# Patient Record
Sex: FEMALE
[De-identification: names, ages, dates, MRNs, and addresses within clinical notes are randomized per-mention and may not be internally consistent; named-entity substitution may affect disease eponyms.]

---

## 2017-12-15 ENCOUNTER — HOSPITAL ENCOUNTER (EMERGENCY)
Dept: HOSPITAL 14 - H.ER | Age: 67
Discharge: HOME | End: 2017-12-15
Payer: COMMERCIAL

## 2017-12-15 VITALS
SYSTOLIC BLOOD PRESSURE: 143 MMHG | DIASTOLIC BLOOD PRESSURE: 78 MMHG | OXYGEN SATURATION: 96 % | RESPIRATION RATE: 17 BRPM | HEART RATE: 76 BPM | TEMPERATURE: 98 F

## 2017-12-15 VITALS — BODY MASS INDEX: 33.5 KG/M2

## 2017-12-15 DIAGNOSIS — E11.9: ICD-10-CM

## 2017-12-15 DIAGNOSIS — I10: ICD-10-CM

## 2017-12-15 DIAGNOSIS — Z79.84: ICD-10-CM

## 2017-12-15 DIAGNOSIS — D25.9: Primary | ICD-10-CM

## 2017-12-15 DIAGNOSIS — Z85.3: ICD-10-CM

## 2017-12-15 DIAGNOSIS — F32.9: ICD-10-CM

## 2017-12-15 NOTE — ED PDOC
HPI: Female  Pain


Time Seen by Provider: 12/15/17 10:28


Chief Complaint (Nursing): Female Genitourinary


Chief Complaint (Provider): Dysuria, Urinary Frequency, Lower Abdominal pain


History Per: Patient


History/Exam Limitations: no limitations


Onset/Duration Of Symptoms: Days (x2)


Current Symptoms Are (Timing): Still Present


Additional Complaint(s): 





Chioma Coburn is a 67 year old female with a past medical history of Breast 

CA presenting to the ED for an evaluation of dysuria, urinary frequency and 

lower abdominal pain occurring for 2 days prior to arrival. The patient 

complains of associated back pain. She denies fever.





PMD: Yoan Grijalva MD








Past Medical History


Reviewed: Historical Data, Nursing Documentation, Vital Signs


Vital Signs: 





 Last Vital Signs











Temp  98 F   12/15/17 10:23


 


Pulse  76   12/15/17 10:23


 


Resp  17   12/15/17 10:23


 


BP  143/78   12/15/17 10:23


 


Pulse Ox  96   12/15/17 10:23














- Medical History


PMH: Depression, Diabetes, HTN, Malignancy (breast CA 5 years ago), Migraine





- Surgical History


Surgical History: 





- Family History


Family History: States: No Known Family Hx





- Immunization History


Hx Tetanus Toxoid Vaccination: No


Hx Influenza Vaccination: No


Hx Pneumococcal Vaccination: No





- Home Medications


Home Medications: 


 Ambulatory Orders











 Medication  Instructions  Recorded


 


Alprazolam [Xanax] 0.5 mg PO DAILY PRN 16


 


Anastrozole [Arimidex 1 mg Tab] 1 mg PO DAILY 16


 


Fluoxetine HCl [Prozac] 40 mg PO DAILY 16


 


Irbesartan [Avapro] 150 mg PO BID 16


 


Ziprasidone [Geodon] 40 mg PO DAILY 16


 


Zolpidem [Ambien] 10 mg PO HS 16


 


metFORMIN [glucOPHAGE] 500 mg PO DAILY 16


 


rOPINIRole [Requip] 1 mg PO HS 16


 


traMADol [Ultram] 50 mg PO Q8 #10 tab 12/15/17














- Allergies


Allergies/Adverse Reactions: 


 Allergies











Allergy/AdvReac Type Severity Reaction Status Date / Time


 


Iodinated Contrast- Oral and Allergy Severe ANAPHYLAXIS Verified 16 19:29





IV Dye     





[Iodinated Contrast Media -     





IV Dye]     


 


iodine Allergy Severe ANAPHYLAXIS Verified 16 19:29


 


Iodine and Iodide Containing Allergy Severe ANAPHYLAXIS Verified 16 19:29





Produc     


 


aspirin Allergy  gi distress Verified 16 19:29


 


simvastatin Allergy  RASH Verified 16 19:29














Review of Systems


ROS Statement: Except As Marked, All Systems Reviewed And Found Negative


Constitutional: Negative for: Fever


Gastrointestinal: Positive for: Abdominal Pain (lower abdominal pain)


Genitourinary Female: Positive for: Dysuria, Frequency


Musculoskeletal: Positive for: Back Pain





Physical Exam





- Reviewed


Nursing Documentation Reviewed: Yes


Vital Signs Reviewed: Yes





- Physical Exam


Appears: Positive for: Non-toxic, No Acute Distress


Head Exam: Positive for: ATRAUMATIC, NORMOCEPHALIC


Skin: Positive for: Normal Color, Warm, Dry


Eye Exam: Positive for: Normal appearance, EOMI


ENT: Positive for: Normal ENT Inspection


Neck: Positive for: Normal, Painless ROM


Cardiovascular/Chest: Positive for: Regular Rate, Rhythm, Chest Non Tender.  

Negative for: Murmur


Respiratory: Positive for: Normal Breath Sounds.  Negative for: Respiratory 

Distress


Gastrointestinal/Abdominal: Positive for: Soft, Tenderness (mild suprapubic 

tenderess)


Back: Positive for: Normal Inspection.  Negative for: L CVA Tenderness, R CVA 

Tenderness


Extremity: Positive for: Normal ROM.  Negative for: Deformity


Neurologic/Psych: Positive for: Alert, Oriented (x3).  Negative for: Motor/

Sensory Deficits





- ECG


O2 Sat by Pulse Oximetry: 96 (RA)


Pulse Ox Interpretation: Normal





Medical Decision Making


Medical Decision Making: 





Time: 10:28


Impression: Dysuria, urinary frequency, lower abdominal pain


Plan:


* [US] Transvaginal


* Reevaluation





--------------------------------------------------------------------------------

----------------- 


Scribe Attestation:


Documented by Michelle Issa, acting as a scribe for Damian Franco MD.


 


Provider Scribe Attestation:


All medical record entries made by the Scribe were at my direction and 

personally dictated by me. I have reviewed the chart and agree that the record 

accurately reflects my personal performance of the history, physical exam, 

medical decision making, and the department course for this patient. I have 

also personally directed, reviewed, and agree with the discharge instructions 

and disposition.








Disposition





- Clinical Impression


Clinical Impression: 


 Fibroid








- Patient ED Disposition


Is Patient to be Admitted: No


Counseled Patient/Family Regarding: Studies Performed, Diagnosis, Need For 

Followup, Rx Given





- Disposition


[facility]errals[facility] 


[facility]


Disposition: Routine/Home


Disposition Time: 13:32


Condition: FAIR


Prescriptions: 


traMADol [Ultram] 50 mg PO Q8 #10 tab


Instructions:  Uterine Fibroids (ED)


Forms:  [facility] (English)

## 2017-12-15 NOTE — US
HISTORY:

Pelvic pain r>l



COMPARISON:

Abdomen pelvis CT without contrast 08/08/2016.



TECHNIQUE:

Transvaginal pelvic ultrasound was performed vascular pelvic pain, 

right side more so than the left. Delete



FINDINGS:



UTERUS:

Measures 5.8 x 4.5 x 3.5 cm. Uterus is atrophic and retroverted with 

multiple myometrial lesions identified. Identified within the upper 

posterior fundus is a hypoechoic partially well defined structure 

measuring 2.2 x 1.6 x 1.1 cm with similar blood flow compared to the 

surrounding parenchyma. Is not shadow or enhance posteriorly. A 2nd 

similar focus is seen at the high anterior fundus sub serosal in 

location measuring 1.6 x 2.1 x 1.0 cm. Immediately inferior to this 

structure is a hyperechoic structure intramural in location measuring 

 1.6 x 1.4 x 1.7 cm. Finally, a 4th structure is questioned inferior 

to this hyperechoic structure at the anterior fundus though it is 

poorly characterized in the periphery. All may represent benign 

myomata including 1 containing calcifications accounting for its 

hyperechoic appearance. However this is not definite. Correlation of 

prior CT is indeterminate as no lesions are seen in the uterus at 

that time. Follow-up MRI may be useful for better characterization. 



ENDOMETRIUM:

Measures 6.0 mm in diameter. Slightly thickened over the normal 

limited 



CERVIX:

5 mm for postmenopausal patient.



RIGHT OVARY:

Not identified. No definite right adnexal mass appreciated either.



LEFT OVARY:

Not identified. No definite left adnexal mass appreciated either.



FREE FLUID:

No significant free fluid noted.



OTHER FINDINGS:

None. 



IMPRESSION:

There are at least 2-3 likely benign myomata seen in the uterine 

fundus anteriorly sulcus posteriorly with 1 potentially partially 

calcified. A 4th lesion is seen which is poorly characterized and the 

endometrium is mildly thickened. Ovaries not identified but there are 

no adnexal mass identified bilaterally either. Further 

characterization of the anterior fundal lesions may be provided by 

MRI with without contrast if clinically warranted.

## 2018-02-14 ENCOUNTER — HOSPITAL ENCOUNTER (EMERGENCY)
Dept: HOSPITAL 14 - H.ER | Age: 68
Discharge: HOME | End: 2018-02-14
Payer: MEDICARE

## 2018-02-14 VITALS — RESPIRATION RATE: 18 BRPM

## 2018-02-14 VITALS
SYSTOLIC BLOOD PRESSURE: 120 MMHG | DIASTOLIC BLOOD PRESSURE: 78 MMHG | TEMPERATURE: 97 F | HEART RATE: 78 BPM | OXYGEN SATURATION: 98 %

## 2018-02-14 VITALS — BODY MASS INDEX: 33.5 KG/M2

## 2018-02-14 DIAGNOSIS — Z79.84: ICD-10-CM

## 2018-02-14 DIAGNOSIS — F32.9: ICD-10-CM

## 2018-02-14 DIAGNOSIS — J06.9: Primary | ICD-10-CM

## 2018-02-14 DIAGNOSIS — E11.9: ICD-10-CM

## 2018-02-14 DIAGNOSIS — Z85.3: ICD-10-CM

## 2018-02-14 DIAGNOSIS — I10: ICD-10-CM

## 2018-02-14 LAB
BASOPHILS # BLD AUTO: 0.1 K/UL (ref 0–0.2)
BASOPHILS NFR BLD: 0.9 % (ref 0–2)
BUN SERPL-MCNC: 12 MG/DL (ref 7–17)
CALCIUM SERPL-MCNC: 10 MG/DL (ref 8.4–10.2)
EOSINOPHIL # BLD AUTO: 0.1 K/UL (ref 0–0.7)
EOSINOPHIL NFR BLD: 1.5 % (ref 0–4)
ERYTHROCYTE [DISTWIDTH] IN BLOOD BY AUTOMATED COUNT: 13.7 % (ref 11.5–14.5)
GFR NON-AFRICAN AMERICAN: > 60
HGB BLD-MCNC: 13.9 G/DL (ref 12–16)
LYMPHOCYTES # BLD AUTO: 1.3 K/UL (ref 1–4.3)
LYMPHOCYTES NFR BLD AUTO: 18 % (ref 20–40)
MCH RBC QN AUTO: 26 PG (ref 27–31)
MCHC RBC AUTO-ENTMCNC: 31.9 G/DL (ref 33–37)
MCV RBC AUTO: 81.5 FL (ref 81–99)
MONOCYTES # BLD: 0.7 K/UL (ref 0–0.8)
MONOCYTES NFR BLD: 10.1 % (ref 0–10)
NEUTROPHILS # BLD: 5 K/UL (ref 1.8–7)
NEUTROPHILS NFR BLD AUTO: 69.5 % (ref 50–75)
NRBC BLD AUTO-RTO: 0.1 % (ref 0–0)
PLATELET # BLD: 284 K/UL (ref 130–400)
PMV BLD AUTO: 8 FL (ref 7.2–11.7)
RBC # BLD AUTO: 5.35 MIL/UL (ref 3.8–5.2)
WBC # BLD AUTO: 7.2 K/UL (ref 4.8–10.8)

## 2018-02-14 NOTE — RAD
HISTORY:



COMPARISON:

11/29/2016.



TECHNIQUE:

Chest PA and lateral



FINDINGS:



LINES AND TUBES:

None. 



LUNG AND PLEURA:

The lungs are well inflated and clear. There is a stable calcified 

granuloma in the left upper lobe. 



HEART AND MEDIASTINUM:

The heart is not enlarged. The hilar and mediastinal contours are 

within normal limits.



SKELETAL STRUCTURES:

The bony structures are within normal limits for the patient's age.



VISUALIZED UPPER ABDOMEN:

Normal.



OTHER FINDINGS:

None.



IMPRESSION:

No active pulmonary disease.

## 2018-02-14 NOTE — ED PDOC
HPI: General Adult


Time Seen by Provider: 18 10:59


Chief Complaint (Nursing): Flu-like Symptoms


History Per: Patient (patient states that she has been sick with cold symptoms 

for the past 2 weeks. She states having some cough, nausea, malaise during this 

time. She called her PMD who advised her to go to the ER.)


History/Exam Limitations: no limitations


Onset/Duration Of Symptoms: Days, Waxing/Waning


Current Symptoms Are (Timing): Still Present





Past Medical History


Reviewed: Historical Data, Nursing Documentation, Vital Signs


Vital Signs: 


 Last Vital Signs











Temp  99.1 F   18 10:56


 


Pulse  84   18 10:56


 


Resp  18   18 10:56


 


BP  118/71   18 10:56


 


Pulse Ox  86 L  18 11:56














- Medical History


PMH: Depression, Diabetes, HTN, Malignancy (breast CA 5 years ago), Migraine





- Surgical History


Surgical History: 





- Family History


Family History: States: No Known Family Hx





- Immunization History


Hx Tetanus Toxoid Vaccination: No


Hx Influenza Vaccination: No


Hx Pneumococcal Vaccination: No





- Home Medications


Home Medications: 


 Ambulatory Orders











 Medication  Instructions  Recorded


 


Alprazolam [Xanax] 0.5 mg PO DAILY PRN 16


 


Anastrozole [Arimidex 1 mg Tab] 1 mg PO DAILY 16


 


Fluoxetine HCl [Prozac] 40 mg PO DAILY 16


 


Irbesartan [Avapro] 150 mg PO BID 16


 


Ziprasidone [Geodon] 40 mg PO DAILY 16


 


Zolpidem [Ambien] 10 mg PO HS 16


 


metFORMIN [glucOPHAGE] 500 mg PO DAILY 16


 


rOPINIRole [Requip] 1 mg PO HS 16


 


Ciprofloxacin HCl [Cipro] 500 mg PO BID #20 tab 12/15/17


 


traMADol [Ultram] 50 mg PO Q8 #10 tab 12/15/17


 


Fluticasone Propionate [Flonase] 4 spr IN DAILY #1 bottle 18


 


Guaifenesin/Pseudoephedrne HCl 1 ter PO Q12H PRN #10 ter 18





[Mucinex D 600 mg-60 mg]  














- Allergies


Allergies/Adverse Reactions: 


 Allergies











Allergy/AdvReac Type Severity Reaction Status Date / Time


 


Iodinated Contrast- Oral and Allergy Severe ANAPHYLAXIS Verified 16 19:29





IV Dye     





[Iodinated Contrast Media -     





IV Dye]     


 


iodine Allergy Severe ANAPHYLAXIS Verified 16 19:29


 


Iodine and Iodide Containing Allergy Severe ANAPHYLAXIS Verified 16 19:29





Produc     


 


aspirin Allergy  gi distress Verified 16 19:29


 


simvastatin Allergy  RASH Verified 16 19:29














Review of Systems


ROS Statement: Except As Marked, All Systems Reviewed And Found Negative


Constitutional: Negative for: Fever, Chills


Respiratory: Positive for: Cough, Shortness of Breath


Gastrointestinal: Negative for: Nausea, Vomiting, Abdominal Pain


Genitourinary Female: Positive for: Dysuria





- Laboratory Results


Result Diagrams: 


 18 12:06





 18 12:06





- ECG


O2 Sat by Pulse Oximetry: 86





Medical Decision Making


Medical Decision Making: 





patient not in distress or discomfort. She is concerned about the fullness in 

her head. Agrees to a trial of mucinex-d and flonase and followup with Dr. VIRIDIANA Grijalva





Disposition





- Clinical Impression


Clinical Impression: 


 URI (upper respiratory infection)








- Patient ED Disposition


Is Patient to be Admitted: No


Doctor Will See Patient In The: Office


Counseled Patient/Family Regarding: Diagnosis, Need For Followup, Rx Given





- Disposition


Referrals: 


Yoan Grijalva MD [Staff Provider] - 


Disposition: Routine/Home


Disposition Time: 13:53


Condition: STABLE


Prescriptions: 


Fluticasone Propionate [Flonase] 4 spr IN DAILY #1 bottle


Guaifenesin/Pseudoephedrne HCl [Mucinex D 600 mg-60 mg] 1 ter PO Q12H PRN #10 

ter


 PRN Reason: cough/congestion


Instructions:  Upper Respiratory Infection (ED)


Forms:  [facility] (English)





- POA


Present On Arrival: None

## 2018-02-15 NOTE — CARD
--------------- APPROVED REPORT --------------





EKG Measurement

Heart [id number]

PA [id number]

[id number]

[id number]

[id number]



<Conclusion>

Normal sinus rhythm

Normal ECG

## 2018-03-14 ENCOUNTER — HOSPITAL ENCOUNTER (EMERGENCY)
Dept: HOSPITAL 14 - H.ER | Age: 68
Discharge: HOME | End: 2018-03-14
Payer: MEDICARE

## 2018-03-14 VITALS
DIASTOLIC BLOOD PRESSURE: 67 MMHG | SYSTOLIC BLOOD PRESSURE: 110 MMHG | RESPIRATION RATE: 16 BRPM | HEART RATE: 87 BPM | TEMPERATURE: 98.3 F | OXYGEN SATURATION: 94 %

## 2018-03-14 VITALS — BODY MASS INDEX: 33.5 KG/M2

## 2018-03-14 DIAGNOSIS — M79.605: Primary | ICD-10-CM

## 2018-03-14 DIAGNOSIS — I10: ICD-10-CM

## 2018-03-14 DIAGNOSIS — Z85.3: ICD-10-CM

## 2018-03-14 DIAGNOSIS — F32.9: ICD-10-CM

## 2018-03-14 DIAGNOSIS — Z79.84: ICD-10-CM

## 2018-03-14 DIAGNOSIS — E11.9: ICD-10-CM

## 2018-03-14 PROCEDURE — 99283 EMERGENCY DEPT VISIT LOW MDM: CPT

## 2018-03-14 PROCEDURE — 72114 X-RAY EXAM L-S SPINE BENDING: CPT

## 2018-03-14 PROCEDURE — 96372 THER/PROPH/DIAG INJ SC/IM: CPT

## 2018-03-14 NOTE — ED PDOC
Lower Extremity Pain/Injury


Time Seen by Provider: 18 17:13


Chief Complaint (Nursing): Lower Extremity Problem/Injury


Additional Complaint(s): 





68 YO F w/ HTN, DM2, PMH of cervical disk bulging and Breast CA 5 years ago


- Patient states she was lifting a case of water three weeks ago after which 

she started having lower back pain and pain on the anterior surface of her 

thigh. Pain has worsened over the last 2 days. Patient has taken tylenol which 

has not provided much relief.  Denies any trauma to the region. Denies fever, 

chills, or any history of clots.





PMH: HTN, DM2, Breast CA x 5 years ago, DM


PSH: C section


FH: Lupus


SH: Lives alone





Past Medical History


Reviewed: Historical Data, Nursing Documentation, Vital Signs


Vital Signs: 





 Last Vital Signs











Temp  98.3 F   18 16:45


 


Pulse  87   18 16:45


 


Resp  16   18 16:45


 


BP  110/67   18 16:45


 


Pulse Ox  94 L  18 16:45














- Medical History


PMH: Depression, Diabetes, HTN, Malignancy (breast CA 5 years ago), Migraine





- Surgical History


Surgical History: 





- Family History


Family History: States: Unknown Family Hx





- Immunization History


Hx Tetanus Toxoid Vaccination: No


Hx Influenza Vaccination: No


Hx Pneumococcal Vaccination: No





- Home Medications


Home Medications: 


 Ambulatory Orders











 Medication  Instructions  Recorded


 


Alprazolam [Xanax] 0.5 mg PO DAILY PRN 16


 


Anastrozole [Arimidex 1 mg Tab] 1 mg PO DAILY 16


 


Fluoxetine HCl [Prozac] 40 mg PO DAILY 16


 


Irbesartan [Avapro] 150 mg PO BID 16


 


Ziprasidone [Geodon] 40 mg PO DAILY 16


 


Zolpidem [Ambien] 10 mg PO HS 16


 


metFORMIN [glucOPHAGE] 500 mg PO DAILY 16


 


rOPINIRole [Requip] 1 mg PO HS 16


 


Ciprofloxacin HCl [Cipro] 500 mg PO BID #20 tab 12/15/17


 


traMADol [Ultram] 50 mg PO Q8 #10 tab 12/15/17


 


Fluticasone Propionate [Flonase] 4 spr IN DAILY #1 bottle 18


 


Guaifenesin/Pseudoephedrne HCl 1 ter PO Q12H PRN #10 ter 18





[Mucinex D 600 mg-60 mg]  


 


Naproxen [Naprosyn] 500 mg PO BID PRN #15 tablet 18














- Allergies


Allergies/Adverse Reactions: 


 Allergies











Allergy/AdvReac Type Severity Reaction Status Date / Time


 


Iodinated Contrast- Oral and Allergy Severe ANAPHYLAXIS Verified 18 16:45





IV Dye     





[Iodinated Contrast Media -     





IV Dye]     


 


iodine Allergy Severe ANAPHYLAXIS Verified 18 16:45


 


Iodine and Iodide Containing Allergy Severe ANAPHYLAXIS Verified 18 16:45





Produc     


 


aspirin Allergy  gi distress Verified 18 16:45


 


simvastatin Allergy  RASH Verified 18 16:45














Wells Criteria for PE





- Wells Criteria for Pulmonary Embolism


Clinical Signs and Symptoms of DVT: No


P.E is #1 Diagnosis, or Equally Likely: No


Heart Rate >100: No


Immobilization at least 3 days;Surgery previous 4 weeks: No


Previous, objectively diagnosed PE or DVT: No


Hemoptysis: No


Malignancy w/treatment within 6 months, or palliative: No


Total Score: 0





Review of Systems


ROS Statement: Except As Marked, All Systems Reviewed And Found Negative





Physical Exam





- Physical Exam


Appears: Positive for: No Acute Distress


Head Exam: Positive for: NORMAL INSPECTION


Skin: Positive for: Normal Color, Warm


Neck: Positive for: Normal


Cardiovascular/Chest: Positive for: Regular Rate, Rhythm


Respiratory: Positive for: Normal Breath Sounds.  Negative for: Rales, Rhonchi, 

Wheezing


Gastrointestinal/Abdominal: Positive for: Normal Exam, Soft.  Negative for: 

Bowel Sounds, Tenderness


Back: Positive for: Vertebral Tenderness (lumbar vertebral tenderness)


Extremity: Positive for: Other (Tenderness over anterior portion of left thigh. 

Sensation and motor intact)


Neurologic/Psych: Positive for: Alert, CNs II-XII, Oriented





- ECG


O2 Sat by Pulse Oximetry: 94





Medical Decision Making


Medical Decision Making: 





Pain was relieved with Tordol 30mg x1


LS spine X ray interpreted by myself : No acute pathology noted. 


- F/U With PMD in 1-2 days





Disposition





- Clinical Impression


Clinical Impression: 


 Leg pain, anterior








- Disposition


Referrals: 


 Service [Outside]


Yoan Grijalva MD [Family Provider] - 


Disposition: Routine/Home


Disposition Time: 19:28


Condition: IMPROVED


Prescriptions: 


Naproxen [Naprosyn] 500 mg PO BID PRN #15 tablet


 PRN Reason: Pain, Moderate (4-7)


Instructions:  Muscle and Bone Pain (DC)


Forms:  [facility] (Romansh)


Print Language: Cymraes

## 2018-03-15 NOTE — RAD
PROCEDURE:  Radiographs of the Lumbar Spine.



HISTORY:

LLE pain







COMPARISON:

No prior.



FINDINGS:



BONES:

Normal alignment. No listhesis. No fracture. No definite destructive 

bony lesion appreciable.



DISC SPACES:

The vertebral body and disc interspace heights appear within normal 

limits throughout.  Limited mid lumbar spondylosis appreciate the 

L2-3 and L3-4 levels anteriorly.



OTHER FINDINGS:

Marked L5-S1 facet joint arthropathy.



IMPRESSION:

No definite fracture or spondylolisthesis.  Limited multilevel lumbar 

spondylosis identified with advanced L5-S1 facet joint arthropathy 

evident. Further characterization can provided by CT or MRI as 

clinically warranted.

## 2019-01-14 ENCOUNTER — HOSPITAL ENCOUNTER (EMERGENCY)
Dept: HOSPITAL 14 - H.ER | Age: 69
Discharge: HOME | End: 2019-01-14
Payer: MEDICARE

## 2019-01-14 VITALS
TEMPERATURE: 97.9 F | HEART RATE: 75 BPM | RESPIRATION RATE: 18 BRPM | SYSTOLIC BLOOD PRESSURE: 127 MMHG | DIASTOLIC BLOOD PRESSURE: 75 MMHG | OXYGEN SATURATION: 96 %

## 2019-01-14 VITALS — BODY MASS INDEX: 32.1 KG/M2

## 2019-01-14 DIAGNOSIS — Z85.3: ICD-10-CM

## 2019-01-14 DIAGNOSIS — I10: ICD-10-CM

## 2019-01-14 DIAGNOSIS — Z86.59: ICD-10-CM

## 2019-01-14 DIAGNOSIS — Z90.13: ICD-10-CM

## 2019-01-14 DIAGNOSIS — E11.9: ICD-10-CM

## 2019-01-14 DIAGNOSIS — J40: Primary | ICD-10-CM

## 2019-01-14 DIAGNOSIS — Z79.84: ICD-10-CM

## 2019-01-14 NOTE — ED PDOC
HPI: CCC, URI, Sore Throat


Time Seen by Provider: 19 12:25


Chief Complaint (Nursing): Cough, Cold, Congestion


Chief Complaint (Provider): Cough, Cold, Congestion


History Per: Patient


History/Exam Limitations: no limitations


Onset/Duration Of Symptoms: Persistent (x1 week)


Current Symptoms Are (Timing): Still Present


Additional Complaint(s): 


68 year old female with pmHx of DM, HTN, and breast CA (in remission), presents 

to ED with a complaint of worsening nonproductive cough associated with 

headache, nasal congestion, tactile fever, and runny nose ongoing for 1 week. 

Patient was seen by her PCP last week and prescribed a nasal decongestant. 

Otherwise, she denies taking any other medication for relief or offers further 

medical complaint. 





PCP: Dr. Yoan Grijalva





Past Medical History


Reviewed: Historical Data, Nursing Documentation, Vital Signs


Vital Signs: 





                                Last Vital Signs











Temp  98.7 F   19 11:41


 


Pulse  73   19 11:41


 


Resp  17   19 11:41


 


BP  156/80 H  19 11:41


 


Pulse Ox  97   19 11:41














- Medical History


PMH: Anxiety, Depression, Diabetes, Diverticulitis, HTN, Hypercholesterolemia, 

Malignancy (breast CA 5 years ago), Migraine


   Denies: HIV, Chronic Kidney Disease





- Surgical History


Surgical History: 


Other surgeries: bilateral mastectomy





- Family History


Family History: States: Unknown Family Hx





- Immunization History


Hx Tetanus Toxoid Vaccination: No


Hx Influenza Vaccination: No


Hx Pneumococcal Vaccination: No





- Home Medications


Home Medications: 


                                Ambulatory Orders











 Medication  Instructions  Recorded


 


Ziprasidone [Geodon Cap] 40 mg PO DAILY 16


 


Zolpidem [Ambien] 10 mg PO HS 16


 


metFORMIN [glucOPHAGE] 500 mg PO DAILY 16


 


Alprazolam [Xanax] 0.5 mg PO DAILY PRN #30 18


 


FLUoxetine [Prozac] 40 mg PO DAILY  cap 18


 


Albuterol HFA [Ventolin HFA 90 1 puff IH Q4 PRN #1 inh 19





mcg/actuation (8 g)]  


 


Benzonatate [Tessalon Perle] 100 mg PO TID PRN #21 capsule 19














- Allergies


Allergies/Adverse Reactions: 


                                    Allergies











Allergy/AdvReac Type Severity Reaction Status Date / Time


 


Iodinated Contrast- Oral and Allergy Severe ANAPHYLAXIS Verified 19 11:57





IV Dye     





[Iodinated Contrast Media -     





IV Dye]     


 


iodine Allergy Severe ANAPHYLAXIS Verified 19 11:57


 


Iodine and Iodide Containing Allergy Severe ANAPHYLAXIS Verified 19 11:57





Produc     


 


aspirin Allergy  gi distress Verified 19 11:57


 


simvastatin Allergy  RASH Verified 19 11:57














Review of Systems


ROS Statement: Except As Marked, All Systems Reviewed And Found Negative


Constitutional: Positive for: Fever (tactile)


ENT: Positive for: Nose Discharge, Nose Congestion


Respiratory: Positive for: Cough (dry).  Negative for: Sputum


Neurological: Positive for: Headache





Physical Exam





- Reviewed


Nursing Documentation Reviewed: Yes


Vital Signs Reviewed: Yes





- Physical Exam


Appears: Positive for: No Acute Distress


Head Exam: Positive for: ATRAUMATIC, NORMAL INSPECTION, NORMOCEPHALIC


Skin: Positive for: Normal Color


Eye Exam: Positive for: Normal appearance


ENT: Positive for: TM Is/Are (clear bilaterally. nonbulging and nonerythema

tous), Nasal Congestion.  Negative for: Pharyngeal Erythema, Tonsillar Exudate, 

Tonsillar Swelling


Neck: Positive for: Normal, Supple


Cardiovascular/Chest: Positive for: Regular Rate, Rhythm, Chest Non Tender.  

Negative for: Bradycardia, Tachycardia


Respiratory: Positive for: Normal Breath Sounds.  Negative for: Wheezing, R

espiratory Distress


Neurologic/Psych: Positive for: Alert, Oriented (x3).  Negative for: 

Motor/Sensory Deficits





- ECG


O2 Sat by Pulse Oximetry: 97 (RA)


Pulse Ox Interpretation: Normal





- Radiology


X-Ray: Viewed By Me, Read By Radiologist


X-Ray Interpretation: No Acute Disease





Medical Decision Making


Medical Decision Making: 


Time: 1225


Initial Plan:


* CXR


* Duoneb 3ml INH





Time: 1311


--CXR: (-) active disease. Upon provider re-evaluation, patient is medically 

stable, reports improvement in symptoms after breathing treatment, and requires 

no further treatment in the ED at this time. Patient will be discharged home 

with Rx for albuterol INH and Tessalon Perle. Counseling was provided and all 

questions were answered regarding diagnosis. There is agreement to discharge 

plan. Advised to follow up with PCP or return to ED if symptoms persist or 

worsen.





Clinical Impression: Bronchitis





-----------------------------------------

--------------------------------------------------------


Scribe Attestation:


Documented by Terra Jameson, acting as a scribe for Sowmya Loera PA-C.


Provider Scribe Attestation:


All medical record entries made by the Scribe were at my direction and 

personally dictated by me. I have reviewed the chart and agree that the record 

accurately reflects my personal performance of the history, physical exam, 

medical decision making, and the department course for this patient. I have also

personally directed, reviewed, and agree with the discharge instructions and 

disposition.





Disposition





- Clinical Impression


Clinical Impression: 


 Bronchitis








- Patient ED Disposition


Is Patient to be Admitted: No


Counseled Patient/Family Regarding: Studies Performed, Diagnosis, Need For 

Followup, Rx Given





- Disposition


Disposition: Routine/Home


Disposition Time: 13:11


Condition: IMPROVED


Prescriptions: 


Albuterol HFA [Ventolin HFA 90 mcg/actuation (8 g)] 1 puff IH Q4 PRN #1 inh


 PRN Reason: Wheezing


Benzonatate [Tessalon Perle] 100 mg PO TID PRN #21 capsule


 PRN Reason: Cough


Instructions:  Acute Bronchitis


Forms:  [facility] (English)

## 2019-01-14 NOTE — RAD
Date of service: 



01/14/2019



HISTORY:

 cough 



COMPARISON:

Chest radiographs 02/14/2018.



TECHNIQUE:

Chest PA and lateral



FINDINGS:



LUNGS:

No active pulmonary disease.  Stable small calcified granuloma left 

apex once again.



PLEURA:

No significant pleural effusion identified. No pneumothorax apparent.



CARDIOVASCULAR:

No aortic atherosclerotic calcification present.



Normal cardiac size. No pulmonary vascular congestion. 



OSSEOUS STRUCTURES:

Subtle scoliotic deformity cervicothoracic spine.



VISUALIZED UPPER ABDOMEN:

Normal.



OTHER FINDINGS:

None.



IMPRESSION:

No interval acute cardiopulmonary disease appreciated.

## 2019-03-28 ENCOUNTER — HOSPITAL ENCOUNTER (EMERGENCY)
Dept: HOSPITAL 14 - H.ER | Age: 69
LOS: 1 days | Discharge: HOME | End: 2019-03-29
Payer: MEDICARE

## 2019-03-28 VITALS — BODY MASS INDEX: 32.1 KG/M2

## 2019-03-28 DIAGNOSIS — Z79.84: ICD-10-CM

## 2019-03-28 DIAGNOSIS — Z85.3: ICD-10-CM

## 2019-03-28 DIAGNOSIS — E11.9: ICD-10-CM

## 2019-03-28 DIAGNOSIS — I10: Primary | ICD-10-CM

## 2019-03-28 DIAGNOSIS — E78.00: ICD-10-CM

## 2019-03-28 DIAGNOSIS — Z88.8: ICD-10-CM

## 2019-03-29 VITALS
TEMPERATURE: 97.5 F | SYSTOLIC BLOOD PRESSURE: 137 MMHG | HEART RATE: 74 BPM | DIASTOLIC BLOOD PRESSURE: 89 MMHG | OXYGEN SATURATION: 99 % | RESPIRATION RATE: 18 BRPM

## 2019-03-29 LAB
ALBUMIN SERPL-MCNC: 4.5 G/DL (ref 3.5–5)
ALBUMIN/GLOB SERPL: 1.2 {RATIO} (ref 1–2.1)
ALT SERPL-CCNC: 55 U/L (ref 9–52)
AST SERPL-CCNC: 32 U/L (ref 14–36)
BASOPHILS # BLD AUTO: 0.1 K/UL (ref 0–0.2)
BASOPHILS NFR BLD: 1.4 % (ref 0–2)
BUN SERPL-MCNC: 17 MG/DL (ref 7–17)
CALCIUM SERPL-MCNC: 9.7 MG/DL (ref 8.4–10.2)
EOSINOPHIL # BLD AUTO: 0.1 K/UL (ref 0–0.7)
EOSINOPHIL NFR BLD: 1.3 % (ref 0–4)
ERYTHROCYTE [DISTWIDTH] IN BLOOD BY AUTOMATED COUNT: 14.4 % (ref 11.5–14.5)
GFR NON-AFRICAN AMERICAN: > 60
HGB BLD-MCNC: 13.8 G/DL (ref 12–16)
LYMPHOCYTES # BLD AUTO: 1.2 K/UL (ref 1–4.3)
LYMPHOCYTES NFR BLD AUTO: 19.5 % (ref 20–40)
MCH RBC QN AUTO: 27.2 PG (ref 27–31)
MCHC RBC AUTO-ENTMCNC: 33.7 G/DL (ref 33–37)
MCV RBC AUTO: 80.8 FL (ref 81–99)
MONOCYTES # BLD: 0.4 K/UL (ref 0–0.8)
MONOCYTES NFR BLD: 6.9 % (ref 0–10)
NEUTROPHILS # BLD: 4.5 K/UL (ref 1.8–7)
NEUTROPHILS NFR BLD AUTO: 70.9 % (ref 50–75)
NRBC BLD AUTO-RTO: 0.2 % (ref 0–0)
PLATELET # BLD: 285 K/UL (ref 130–400)
PMV BLD AUTO: 8.7 FL (ref 7.2–11.7)
RBC # BLD AUTO: 5.08 MIL/UL (ref 3.8–5.2)
WBC # BLD AUTO: 6.3 K/UL (ref 4.8–10.8)

## 2019-03-29 NOTE — CARD
--------------- APPROVED REPORT --------------





Date of service: 03/28/2019



EKG Measurement

Heart [id number]

[id number]

[id number]

[id number]

[id number]



<Conclusion>

Normal sinus rhythm

Normal Electrocardiogram

## 2019-03-29 NOTE — ED PDOC
HPI: Hypertension/Hypotension


Time Seen by Provider: 19 00:00


Chief Complaint (Nursing): Dizziness/Lightheaded


Chief Complaint (Provider): high blood pressure


History Per: Patient


History/Exam Limitations: no limitations


Onset/Duration Of Symptoms: Hrs (1)


Current Symptoms Are (Timing): Better


Associated Symptoms: Dizziness, Blurred Vision


Additional Complaint(s): 





69 y/o female history of hypertension, diabetes, anxiety brought in by EMS for 

evaluation of high blood pressure x 1 hour.  Patient states she ate dinner that 

had some salt in it and she normally eats no salt; states she took her blood 

pressure medication at 20:00 and then her Zolpidem sleep medication at 20:30.  

Patient states around 23:30 she was "shaking", so she checked her blood pressure

and it was 167/90's, which she states is high for her.  Patient states she also 

started to feel dizzy, described as off balance, with blurred vision; but states

this sometimes happens with her sleep medication.  Patient also states she was 

arguing with her daughter right before symptoms began. Patient admits to 

improvement of symptoms upon arrival. Denies headache, extremity 

numbness/weakness, neck pain, chest pain, shortness of breath, palpitations, leg

pain/swelling.  











Past Medical History


Reviewed: Historical Data, Nursing Documentation, Vital Signs


Vital Signs: 





                                Last Vital Signs











Temp  98.1 F   19 23:26


 


Pulse  80   19 23:26


 


Resp  16   19 23:26


 


BP  157/80 H  19 23:26


 


Pulse Ox  93 L  19 23:26














- Medical History


PMH: Anxiety, Depression, Diabetes, Diverticulitis, HTN, Hypercholesterolemia, 

Malignancy (breast CA 5 years ago), Migraine


   Denies: HIV, Chronic Kidney Disease





- Surgical History


Surgical History: 





- Family History


Family History: States: Unknown Family Hx





- Immunization History


Hx Tetanus Toxoid Vaccination: No


Hx Influenza Vaccination: No


Hx Pneumococcal Vaccination: No





- Home Medications


Home Medications: 


                                Ambulatory Orders











 Medication  Instructions  Recorded


 


Ziprasidone [Geodon Cap] 40 mg PO DAILY 16


 


Zolpidem [Ambien] 10 mg PO HS 16


 


metFORMIN [glucOPHAGE] 500 mg PO DAILY 16


 


Alprazolam [Xanax] 0.5 mg PO DAILY PRN #30 18


 


FLUoxetine [Prozac] 40 mg PO DAILY  cap 18


 


Albuterol HFA [Ventolin HFA 90 1 puff IH Q4 PRN #1 inh 19





mcg/actuation (8 g)]  


 


Benzonatate [Tessalon Perle] 100 mg PO TID PRN #21 capsule 19














- Allergies


Allergies/Adverse Reactions: 


                                    Allergies











Allergy/AdvReac Type Severity Reaction Status Date / Time


 


Iodinated Contrast- Oral and Allergy Severe ANAPHYLAXIS Verified 19 11:57





IV Dye     





[Iodinated Contrast Media -     





IV Dye]     


 


iodine Allergy Severe ANAPHYLAXIS Verified 19 11:57


 


Iodine and Iodide Containing Allergy Severe ANAPHYLAXIS Verified 19 11:57





Produc     


 


aspirin Allergy  gi distress Verified 19 11:57


 


simvastatin Allergy  RASH Verified 19 11:57














Review of Systems


ROS Statement: Except As Marked, All Systems Reviewed And Found Negative


Neurological: Positive for: Dizziness





Physical Exam





- Reviewed


Nursing Documentation Reviewed: Yes


Vital Signs Reviewed: Yes





- Physical Exam


Appears: Positive for: Well, Non-toxic, No Acute Distress


Head Exam: Positive for: ATRAUMATIC, NORMAL INSPECTION, NORMOCEPHALIC


Skin: Positive for: Normal Color


Eye Exam: Positive for: EOMI, PERRL


ENT: Positive for: Normal ENT Inspection


Cardiovascular/Chest: Positive for: Regular Rate, Rhythm


Respiratory: Positive for: Normal Breath Sounds


Gastrointestinal/Abdominal: Positive for: Normal Exam


Back: Positive for: Normal Inspection


Extremity: Positive for: Normal ROM


Neurological/Psych: Positive for: Awake, Alert, Oriented (x3)





- Laboratory Results


Result Diagrams: 


                                 19 01:00





                                 19 01:50





- ECG


O2 Sat by Pulse Oximetry: 93





- Progress


ED Course And Treament: 





-accucheck


-ekg


-cbc


-cmp


-troponin


-cardiac monitor


-CT head





CT SCAN OF THE BRAIN WITHOUT IV CONTRAST 


CLINICAL INDICATION: Dizziness.





COMPARISON: 2018 08:19 PM EDT: CT\SD: HEAD W/O CONTRAST


TECHNIQUE: Axial and reformatted sagittal and coronal images of the brain 

obtained without IV contrast administration. 


Normal size of the ventricles and extra-axial spaces for the patient's age. 

Normal white matter tracts of the supratentorial brain. Normal basal ganglia and

thalami. Normal brainstem. Normal cerebellum. 


There is no demonstrated extra-axial, intraparenchymal, or intraventricular 

hemorrhage. 


There are no findings of an acute ischemic infarction. 


Normal calvarium. There is no demonstrated fracture. 


Normal soft tissue structures. 


Normal visualized paranasal sinuses. 


IMPRESSION: 


Normal unenhanced CT scan of the brain





On re-eval, patient resting comfortably; states she is feeling better just 

tired.


Patient educated on findings, discharged with instructions to follow up with 

PMD/cardiologist within 2-3 days


Return precautions given




















Disposition





- Clinical Impression


Clinical Impression: 


 Hypertension, Medication side effect








- Patient ED Disposition


Is Patient to be Admitted: No


Counseled Patient/Family Regarding: Studies Performed, Diagnosis, Need For 

Followup





- Disposition


Referrals: 


Yoan Grijalva MD [Primary Care Provider] - 


Disposition: Routine/Home


Disposition Time: 03:00


Condition: IMPROVED


Instructions:  High Blood Pressure in Adults, Side Effects From Medicines


Forms:  [facility] (English)

## 2023-04-24 NOTE — CT
Date of service: 



03/29/2019



PROCEDURE:  CT HEAD WITHOUT CONTRAST.



HISTORY:

dizziness



COMPARISON:

Noncontrast head CT 09/20/2018.



TECHNIQUE:

Axial computed tomography images were obtained through the head/brain 

without intravenous contrast.  



Radiation dose:



Total exam DLP = 820.01 mGy-cm.



This CT exam was performed using one or more of the following dose 

reduction techniques: Automated exposure control, adjustment of the 

mA and/or kV according to patient size, and/or use of iterative 

reconstruction technique.



FINDINGS:



HEMORRHAGE:

No intracranial hemorrhage. 



BRAIN:

Minimal minimal bilateral basal ganglia calcifications are again 

identified with remaining brain density normal above and below the 

tentorium and brainstem otherwise.  No mass effect.  Good 

corticomedullary differentiation is preserved once again.  No 

suspicious extra-axial collection and midline brain and appears 

within normal limits throughout. 



VENTRICLES:

Unremarkable. No hydrocephalus. 



CALVARIUM:

Unremarkable.



PARANASAL SINUSES:

Unremarkable as visualized. No significant inflammatory changes.



MASTOID AIR CELLS:

Unremarkable as visualized. No inflammatory changes.



OTHER FINDINGS:

None.



IMPRESSION:

No definite acute intracranial findings or significant oral change 

compared 09/20/2018 head CT.



[facility] preliminary report from [facility], 03/29/2019, 1:42 a.m.. Spoke with mom, changed appt. No other questions or concerns.